# Patient Record
Sex: MALE | Race: WHITE | NOT HISPANIC OR LATINO | Employment: OTHER | URBAN - METROPOLITAN AREA
[De-identification: names, ages, dates, MRNs, and addresses within clinical notes are randomized per-mention and may not be internally consistent; named-entity substitution may affect disease eponyms.]

---

## 2017-10-05 PROBLEM — D72.829 LEUCOCYTOSIS: Status: ACTIVE | Noted: 2017-10-05

## 2017-10-19 ENCOUNTER — OFFICE VISIT (OUTPATIENT)
Dept: HEMATOLOGY/ONCOLOGY | Facility: CLINIC | Age: 62
End: 2017-10-19
Payer: COMMERCIAL

## 2017-10-19 VITALS
DIASTOLIC BLOOD PRESSURE: 102 MMHG | WEIGHT: 182.69 LBS | TEMPERATURE: 98 F | HEIGHT: 69 IN | SYSTOLIC BLOOD PRESSURE: 183 MMHG | BODY MASS INDEX: 27.06 KG/M2 | RESPIRATION RATE: 18 BRPM | HEART RATE: 76 BPM

## 2017-10-19 DIAGNOSIS — E55.9 VITAMIN D DEFICIENCY: ICD-10-CM

## 2017-10-19 DIAGNOSIS — B18.2 CHRONIC HEPATITIS C WITHOUT HEPATIC COMA: ICD-10-CM

## 2017-10-19 DIAGNOSIS — D72.828 OTHER ELEVATED WHITE BLOOD CELL (WBC) COUNT: ICD-10-CM

## 2017-10-19 DIAGNOSIS — Z12.5 SCREENING PSA (PROSTATE SPECIFIC ANTIGEN): Primary | ICD-10-CM

## 2017-10-19 PROBLEM — D72.829 LEUCOCYTOSIS: Status: ACTIVE | Noted: 2017-10-19

## 2017-10-19 PROCEDURE — 99203 OFFICE O/P NEW LOW 30 MIN: CPT | Mod: ,,, | Performed by: INTERNAL MEDICINE

## 2017-10-19 RX ORDER — ERGOCALCIFEROL 1.25 MG/1
1 CAPSULE ORAL
COMMUNITY

## 2017-10-19 NOTE — PROGRESS NOTES
"[facility] Hematolgy/Oncology  History & Physical    Subjective:      Patient ID:   NAME: Abilio Nunez : 1955     62 y.o. male    Referring Doc: Jay Ame[facility]quita ([facility])  Other Physicians: Rosa Arauz ([facility])        Chief Complaint:   leucocytosis    HPI:  62 y.o. male with diagnosis of chronic mild leucocytosis who has [facility] hematology/oncology. His wbc in 2017 was 16.9 with a mild elevation in monocytes and neutrophils. He has a history of Hep C and had an US in 2016 which showed a normal appearing liver. He is a current active smoker and has smoked a 1 ppd for the past 40+ years. His wbc was elevated at 15.1 in 2017. He is here with his son. He has been doing "good". He denies ay weight loss, night sweats or fevers. He denies any CP, SOB, HA's or N/V.               ROS:   GEN: normal without any fever, night sweats or weight loss  HEENT: normal with no HA's, sore throat, stiff neck, changes in vision  CV: normal with no CP, SOB, PND, GOMEZ or orthopnea  PULM: normal with no SOB, cough, hemoptysis, sputum or pleuritic pain  GI: normal with no abdominal pain, nausea, vomiting, constipation, diarrhea, melanotic stools, BRBPR, or hematemesis  : normal with no hematuria, dysuria  BREAST: normal with no mass, discharge, pain  SKIN: normal with no rash, erythema, bruising, or swelling       Past Medical/Surgical History:  Past Medical History:   Diagnosis Date    Chronic hepatitis C without hepatic coma 10/19/2017    Leucocytosis 10/5/2017    Leucocytosis 10/19/2017    Vitamin D deficiency 10/19/2017     No past surgical history on file.      Allergies:  Review of patient's allergies indicates:  Allergies not on file    Social/Family History:  Social History     Social History    Marital status:      Spouse name: N/A    Number of children: N/A    Years of education: N/A     Occupational History    Not on file.     Social History " "Main Topics    Smoking status: Current Every Day Smoker     Packs/day: 1.00     Types: Cigarettes    Smokeless tobacco: Current User     Types: Chew    Alcohol use 3.0 oz/week     5 Cans of beer per week    Drug use: No    Sexual activity: Not on file     Other Topics Concern    Not on file     Social History Narrative    No narrative on file     No family history on file.      Medications:    Current Outpatient Prescriptions:     ergocalciferol (ERGOCALCIFEROL) 50,000 unit Cap, Take 1 tablet by mouth., Disp: , Rfl:       Pathology:  No matching staging information was found for the patient.      Objective:   Vitals:  Blood pressure (!) 183/102, pulse 76, temperature 97.7 °F (36.5 °C), resp. rate 18, height 5' 9" (1.753 m), weight 82.9 kg (182 lb 11.2 oz).    Physical Examination:   GEN: no apparent distress, comfortable; AAOx3  HEAD: atraumatic and normocephalic  EYES: no pallor, no icterus, PERRLA  ENT: OMM, no pharyngeal erythema, external ears WNL; no nasal discharge; no thrush  NECK: no masses, thyroid normal, trachea midline, no LAD/LN's, supple  CV: RRR with no murmur; normal pulse; normal S1 and S2; no pedal edema  CHEST: Normal respiratory effort; CTAB; normal breath sounds; no wheeze or crackles  ABDOM: nontender and nondistended; soft; normal bowel sounds; no rebound/guarding  MUSC/Skeletal: ROM normal; no crepitus; joints normal; no deformities or arthropathy  EXTREM: no clubbing, cyanosis, inflammation or swelling  SKIN: no rashes, lesions, ulcers, petechiae or subcutaneous nodules  : no saunders  NEURO: grossly intact; motor/sensory WNL; AAOx3; no tremors  PSYCH: normal mood, affect and behavior  LYMPH: normal cervical, supraclavicular, axillary and groin LN's      Labs:   July 26th 2017 last available on chart    Radiology/Diagnostic Studies:    US Liver June 2016      All lab results and imaging results have been reviewed and discussed with the patient    Assessment:   (1) 62 y.o. male with " diagnosis of chronic mild leucocytosis who has [facility] hematology/oncology. His wbc in July 2017 was 16.9 with a mild elevation in monocytes and neutrophils.    - He is a current active smoker and has smoked a 1 ppd for the past 40+ years. This may be the etiology of the elevated wbc  -  His wbc was elevated in Feb 2017 and June 2016  - will order leukemia screen with flow cytometry  - rule out inflammatory process if possible - will check JANIE, RF, Sed rate and CRP      (2) Chronic Hep C with prior elevation of LFT's; he had an US in June 2016 which showed a normal appearing liver  - [facility] (Rosa Arauz NP)  - s/p Harvoni therapy x 12 weeks 9 months ago    (3) Vit D deficiency - on Vit D supplements    (4) BPH    (5) Hx/of syphilis in past        Plan:       Other elevated white blood cell (WBC) count    Vitamin D deficiency    Chronic hepatitis C without hepatic coma            PLAN:  1. Check leukemia screen with flow  2. Order JANIE, RF, sed rate, CRP  3. Encouraged tobacco cessation  4. F/u with GI and PCP  RTC in  3-4  weeks   Fax note to Davonte Tidwell        I have explained and the patient understands all of  the current recommendation(s). I have answered all of their questions to the best of my ability and to their complete satisfaction.             Thank you for allowing me to participate in this patient's care. Please call with any questions or concerns.    Electronically signed Kang Florence MD

## 2017-12-18 LAB
ALBUMIN SERPL-MCNC: 4.5 G/DL (ref 3.1–4.7)
ALP SERPL-CCNC: 75 IU/L (ref 40–104)
ALT (SGPT): 30 IU/L (ref 3–33)
AST SERPL-CCNC: 31 IU/L (ref 10–40)
BASOPHILS NFR BLD: 0.1 K/UL (ref 0–0.2)
BASOPHILS NFR BLD: 0.5 %
BILIRUB SERPL-MCNC: 0.8 MG/DL (ref 0.3–1)
BUN SERPL-MCNC: 28 MG/DL (ref 8–20)
CALCIUM SERPL-MCNC: 9.1 MG/DL (ref 7.7–10.4)
CHLORIDE: 105 MMOL/L (ref 98–110)
CO2 SERPL-SCNC: 26.4 MMOL/L (ref 22.8–31.6)
COMPLEXED PSA SERPL-MCNC: 0.3 NG/ML (ref 0–3)
CREATININE: 0.87 MG/DL (ref 0.6–1.4)
CRP SERPL-MCNC: 0.04 MG/DL (ref 0–1.4)
EOSINOPHIL NFR BLD: 0.2 K/UL (ref 0–0.7)
EOSINOPHIL NFR BLD: 2.2 %
ERYTHROCYTE [DISTWIDTH] IN BLOOD BY AUTOMATED COUNT: 11.3 % (ref 12.5–14.5)
FOLATE SERPL-MCNC: 22.5 NG/ML (ref 2.2–11.2)
GLUCOSE: 108 MG/DL (ref 70–99)
GRAN #: 5.9 K/UL (ref 1.4–6.5)
GRAN%: 62.8 %
HCT VFR BLD AUTO: 44.5 % (ref 39–55)
HGB BLD-MCNC: 16 G/DL (ref 14–16)
IMMATURE GRANS (ABS): 0 K/UL (ref 0–1)
IMMATURE GRANULOCYTES: 0.4 %
LYMPH #: 2.3 K/UL (ref 1.2–3.4)
LYMPH%: 24 %
MCH RBC QN AUTO: 34.8 PG (ref 25–35)
MCHC RBC AUTO-ENTMCNC: 36 G/DL (ref 31–36)
MCV RBC AUTO: 96.7 FL (ref 80–100)
MONO #: 1 K/UL (ref 0.1–0.6)
MONO%: 10.1 %
NUCLEATED RBCS: 0 %
NUCLEATED RED BLOOD CELLS: 0 /100 WBC
PERFORMED BY:: ABNORMAL
PLATELET # BLD AUTO: 255 K/UL (ref 140–440)
PMV BLD AUTO: 8.5 FL (ref 8.8–12.7)
POTASSIUM SERPL-SCNC: 4.2 MMOL/L (ref 3.5–5)
PROT SERPL-MCNC: 7.7 G/DL (ref 6–8.2)
RBC # BLD AUTO: 4.6 M/UL (ref 4.3–5.9)
SODIUM: 140 MMOL/L (ref 134–144)
VITAMIN B12: 790 PG/ML (ref 62–940)
WBC # BLD: 9.4 K/UL (ref 5–10)

## 2017-12-19 LAB — RHEUMATOID FACT SERPL-ACNC: 11.9 IU/ML (ref 0–13.9)

## 2018-01-09 ENCOUNTER — OFFICE VISIT (OUTPATIENT)
Dept: HEMATOLOGY/ONCOLOGY | Facility: CLINIC | Age: 63
End: 2018-01-09
Payer: COMMERCIAL

## 2018-01-09 VITALS
HEART RATE: 69 BPM | TEMPERATURE: 98 F | RESPIRATION RATE: 18 BRPM | WEIGHT: 180.69 LBS | SYSTOLIC BLOOD PRESSURE: 169 MMHG | DIASTOLIC BLOOD PRESSURE: 92 MMHG | HEIGHT: 69 IN | BODY MASS INDEX: 26.76 KG/M2

## 2018-01-09 DIAGNOSIS — N41.1 CHRONIC PROSTATITIS: ICD-10-CM

## 2018-01-09 DIAGNOSIS — D72.828 OTHER ELEVATED WHITE BLOOD CELL (WBC) COUNT: Primary | ICD-10-CM

## 2018-01-09 PROCEDURE — 99214 OFFICE O/P EST MOD 30 MIN: CPT | Mod: ,,, | Performed by: INTERNAL MEDICINE

## 2018-01-09 NOTE — PROGRESS NOTES
"   [facility] Hematology/Oncology  PROGRESS NOTE      Subjective:       Patient ID:   NAME: Abilio Nunez : 1955     62 y.o. male    Referring Doc: Jay Amezquita ([facility])  Other Physicians: Rosa Arauz ([facility])    Chief Complaint:  Leucocytosis f/u    History of Present Illness:     Patient returns today for a 2nd regularly scheduled follow-up visit.  The patient is here to go over the results of the recently ordered labs, tests and studies. He is here with his wife. He is doing ok with no new issues. He denies any CP, SOB, HA's or N/V. He has had some fatigue. Repeat CBC showed normal WBC and fairly adequate differential.             ROS:   GEN: normal without any fever, night sweats or weight loss  HEENT: normal with no HA's, sore throat, stiff neck, changes in vision  CV: normal with no CP, SOB, PND, GOMEZ or orthopnea  PULM: normal with no SOB, cough, hemoptysis, sputum or pleuritic pain  GI: normal with no abdominal pain, nausea, vomiting, constipation, diarrhea, melanotic stools, BRBPR, or hematemesis  : normal with no hematuria, dysuria  BREAST: normal with no mass, discharge, pain  SKIN: normal with no rash, erythema, bruising, or swelling    Allergies:  Review of patient's allergies indicates:  No Known Allergies    Medications:    Current Outpatient Prescriptions:     ergocalciferol (ERGOCALCIFEROL) 50,000 unit Cap, Take 1 tablet by mouth., Disp: , Rfl:     PMHx/PSHx Updates:  See patient's last visit with me on 10/19/2017.  See H&P on 10/19/2017        Pathology:  No matching staging information was found for the patient.          Objective:     Vitals:  Blood pressure (!) 169/92, pulse 69, temperature 97.6 °F (36.4 °C), resp. rate 18, height 5' 9" (1.753 m), weight 82 kg (180 lb 11.2 oz).    Physical Examination:   GEN: no apparent distress, comfortable; AAOx3  HEAD: atraumatic and normocephalic  EYES: no pallor, no icterus, PERRLA  ENT: OMM, no pharyngeal erythema, external ears WNL; no nasal " discharge; no thrush  NECK: no masses, thyroid normal, trachea midline, no LAD/LN's, supple  CV: RRR with no murmur; normal pulse; normal S1 and S2; no pedal edema  CHEST: Normal respiratory effort; CTAB; normal breath sounds; no wheeze or crackles  ABDOM: nontender and nondistended; soft; normal bowel sounds; no rebound/guarding  MUSC/Skeletal: ROM normal; no crepitus; joints normal; no deformities or arthropathy  EXTREM: no clubbing, cyanosis, inflammation or swelling  SKIN: no rashes, lesions, ulcers, petechiae or subcutaneous nodules  : no saunders  NEURO: grossly intact; motor/sensory WNL; AAOx3; no tremors  PSYCH: normal mood, affect and behavior  LYMPH: normal cervical, supraclavicular, axillary and groin LN's            Labs:     12/18/2017  Lab Results   Component Value Date    WBC 9.4 12/18/2017    HGB 16.0 12/18/2017    HCT 44.5 12/18/2017     12/18/2017     BMP  Lab Results   Component Value Date     12/18/2017    K 4.2 12/18/2017     12/18/2017    CO2 26.4 12/18/2017    BUN 28 (H) 12/18/2017    CREATININE 0.87 12/18/2017    CALCIUM 9.1 12/18/2017     Lab Results   Component Value Date    AST 31 12/18/2017    ALKPHOS 75 12/18/2017    BILITOT 0.8 12/18/2017     No results found for: [id number]  Lab Results   Component Value Date    FOLATE 22.5 (H) 12/18/2017           Radiology/Diagnostic Studies:    No results found.    I have reviewed all available lab results and radiology reports.    Assessment/Plan:   (1) 62 y.o. male with diagnosis of chronic mild leucocytosis who has been [facility] hematology/oncology. His wbc in July 2017 was 16.9 with a mild elevation in monocytes and neutrophils.    - He is a current active smoker and has smoked a 1 ppd for the past 40+ years. This may be the etiology of the elevated wbc  -  His wbc was elevated in Feb 2017 and June 2016  - repeat WBC was normal and differential was relatively adequate  - no  anemia or platelet issues  - leukemia screen with flow was ordered but was not done by the lab     (2) Chronic Hep C with prior elevation of LFT's; he had an US in June 2016 which showed a normal appearing liver  - [facility] (Rosa [facility]uz NP)  - s/p Harvoni therapy x 12 weeks 9 months ago     (3) Vit D deficiency - on Vit D supplements     (4) BPH     (5) Hx/of syphilis in past    (6) Chronic prostate issues which could be the etiologic agent of the chronic leucocytosis  - I recommend f/u with   - PSA was normal      PLAN:  1. monitor labs monthly for now  2. If any drastic changes, will then consider bone marrow biopsy  3. He should see an Urologist for the chronic prostate issues  4. Refer to  Dr Powers    RT in 4months  Fax note to Jayden    Discussion:     I have explained all of the above in detail and the patient understands all of the current recommendation(s). I have answered all of their questions to the best of my ability and to their complete satisfaction.   The patient is to continue with the current management plan.            Electronically signed by Kang Florence MD

## 2018-01-12 ENCOUNTER — TELEPHONE (OUTPATIENT)
Dept: UROLOGY | Facility: CLINIC | Age: 63
End: 2018-01-12

## 2018-01-12 NOTE — TELEPHONE ENCOUNTER
----- Message from Mik Powers MD sent at 1/9/2018  5:05 PM CST -----  Thanks for the referral    I have ccED my team to get him scheduled    Ladies - please schedule Mr Nunez for next avail NP appointment.  As Dr Florence mentioned he has had chronic prostate issues please see who has treated him in the past and obtain any relevant records, as well as any psa history not in the system.    Thanks    ----- Message -----  From: Kang Florence MD  Sent: 1/9/2018   1:21 PM  To: Mik Powers MD

## 2018-01-12 NOTE — TELEPHONE ENCOUNTER
Spoke with patient and wife, patient agreed with scheduled appt day 02/05/2018 at 1415. I did receive some information in regards to prior urologist. Patient and wife stated that he has seen Dr. Kaur in [place], previously [phone] if the phone number.     Patient and wife also stated that he was seen at [facility] which is where he had his prostate surgery approximately 8-10 yrs ago, & the urologist that performed this surgery was Dr. Allen.       I will request these records from above listed providers.

## 2018-03-31 NOTE — PROGRESS NOTES
[facility] Urology Consult:  Consult from: Dr Florence  Consult for: BPH    Abilio Nunez is a 62 y.o. male Referred by Dr Florence for evaluation of BPH and chronic prostate issues.    He was referred to Dr Florence in 10/17 for evaluation of persistent [facility]. His wbc in July 2017 was 16.9 with a mild elevation in monocytes and neutrophils. He has a history Hep C and had an US in June 2016 which showed a normal appearing liver and he completed 12 weeks Harvoni therapy. He is a current active smoker and has smoked a 1 ppd for the past 40+ years. His wbc was elevated at 15.1 in Feb 2017. Repeat WBC was normal and differential was relatively adequate. No anemia or platelet issues. On VitD supplements for deficiency.  Dr Florence is concerned his chronic prostate issues may be contributing to his leukocytosis.  12/18/17 PSA 0.3, Cr 0.87    He has previously seen Dr Kaur as well as urology at [facility] where he had prostate surgery, TURP, 8-10 years ago (Dr Allen)   [facility] and never had trouble urinating after TURP.  Had chronic prostatitis and was on alpha blocker and had many rounds of antibiotics prior to TURP  Was on CIC with Dr Kaur, then got bladder infection, all of which was before turp  No known family history of prostate cancer      AUA SS 3/0: 2 frequency; 1 nocturia  Large volume voids  Last urinated 2 hours prior, did not feel need to urinate - asked to do so and urinalysis negative, PVR  116cc  He gets up in the morning, urinates.  Has 2 cups of Coffee, then urinates again. At about 12:30 -1:00pm, he thinks he should urinate before making the trip home so he does but does not have the urge to do so.  May then urinate before bed.  Nocturia ×1.  Works as a bunch.  Denies hematuria, dysuria  Denies frequency, urgency  Does c/o decreased libido without gross ED and some fatigue  Denies perineal. Perirectal or ejaculatory  pain        Past Medical History:   Diagnosis Date    BPH (benign prostatic hyperplasia)     Chronic hepatitis C without hepatic coma 10/19/2017    Hepatitis C, chronic     Leucocytosis 10/5/2017    Leucocytosis 10/19/2017    Vitamin D deficiency 10/19/2017       History reviewed. No pertinent surgical history.    Family History   Problem Relation Age of Onset    Diabetes Mother     Lung cancer Father     Diabetes Sister        Social History     Social History    Marital status:      Spouse name: N/A    Number of children: N/A    Years of education: N/A     Occupational History    Not on file.     Social History Main Topics    Smoking status: Current Every Day Smoker     Packs/day: 1.00     Types: Cigarettes    Smokeless tobacco: Current User     Types: Chew    Alcohol use 3.0 oz/week     5 Cans of beer per week    Drug use: No    Sexual activity: Not on file     Other Topics Concern    Not on file     Social History Narrative    No narrative on file       Review of patient's allergies indicates:  No Known Allergies    Medications Reviewed: see MAR    ROS:    Constitutional: denies fevers, chills, night sweats, fatigue, malaise  Respiratory: negative for cough, shortness of breath, wheezing, dyspnea.  Cardiovascular: negative for chest pain, varicose veins, ankle swelling, palpitations, syncope.  GI: negative for abdominal pain, heartburn, indigestion, nausea, vomiting, constipation, diarrhea, blood in stool.   Urology: as noted above in HPI  Endocrinology: negative for cold intolerance, excessive thirst, not feeling tired/sluggish, no heat intolerance.   Hematology/Lymph: negative for easy bleeding, easy bruising, swollen glands.  Musculoskeletal: negative for back pain, joint pain, joint swelling, neck pain.  Allergy-Immunology: negative for seasonal allergies, negative for unusual infections.   Skin: negative for boils, breast lumps, hives, itching, rash.   Neurology: negative for,  "dizziness, headache, tingling/numbness, tremors.   Psych: satisfied with life; negative for, anxiety, depression, suicidal thoughts.     PHYSICAL EXAM:    Vitals:    04/02/18 1459   BP: (!) 159/87   Pulse: 79   Resp: 18   Temp: 98 °F (36.7 °C)     Body mass index is 26.7 kg/m². Weight: 82 kg (180 lb 12.4 oz) Height: 5' 9" (175.3 cm)       General: Alert, cooperative, no distress, appears stated age  Head: Normocephalic, without obvious abnormality, atraumatic  Neck: no masses, no thyromegaly, no lymphadenopathy  Eyes: PERRL, conjunctiva/corneas clear  Lungs: Respirations unlabored, normal effort, no accessory muscle use  CV: Warm and well perfused extremities  Abdomen: Soft, non-tender, no CVA tenderness, no hepatosplenomegaly, no hernia  Penis: phallus normal, circumcised, well cared for, no plaques or lesions.   Scrotum: no cysts, no lesions, no rash, no hydrocele.   Epididymes: normal, nontender, symmetrical, no masses or cysts.   Testes: both descended, no masses, normal, R>L.   Urethra: palpably normal with orthotopic meatus of normal size    ADAN: normal sphincter tone, no masses, no hemmorrhoids   PROSTATE: 35g, no nodules, non-tender, symmetrical.   Extremities: Extremities normal, atraumatic, no cyanosis or edema  Skin: Normal color, texture, and turgor, no rashes or lesions  Psych: Appropriate, well oriented, normal affect, normal mood  Neuro: Non-focal    Lab Results   Component Value Date    PSA 0.3 12/18/2017       LABS:    Recent Results (from the past 336 hour(s))   POCT Bladder Scan    Collection Time: 04/02/18  3:46 PM   Result Value Ref Range    POC Residual Urine Volume 116 (A) 0 - 100 mL   POCT URINE DIPSTICK WITHOUT MICROSCOPE    Collection Time: 04/02/18  3:50 PM   Result Value Ref Range    Color, UA clear     Spec Grav UA 1.010     pH, UA 8.0     WBC, UA neg     Nitrite, UA neg     Protein neg     Glucose, UA neg     Ketones, UA neg     Urobilinogen, UA neg     Bilirubin neg     Blood, UA neg  "         Assessment/Diagnosis:    1. BPH without obstruction/lower urinary tract symptoms  POCT Bladder Scan    POCT URINE DIPSTICK WITHOUT MICROSCOPE   2. Prostate cancer screening  PSA, Screening   3. Low libido  Testosterone Panel       Plans:  He has no bothersome LUTS. He may have adapted his voiding in the face of longstanding obstruction present prior to TURP but voids well without complaint, though with some decreased sensation of filling. He has mild incomplete emptying at 116cc but no bothersome LUTS nor pyuria. ADAN benign and prostate not boggy or tender concerning for prostatitis.  In the absence of these symptoms and clinical findings, would not suspect prostate issues at play with leukocytosis, which has since resolved.  At his request, for libido complaint, will check Testosterone panel and chart check results and have further discussion if indicated  Otherwise RTC annually with psa for CaP screening, or prn in interim

## 2018-04-02 ENCOUNTER — OFFICE VISIT (OUTPATIENT)
Dept: UROLOGY | Facility: CLINIC | Age: 63
End: 2018-04-02
Payer: OTHER GOVERNMENT

## 2018-04-02 VITALS
BODY MASS INDEX: 26.77 KG/M2 | WEIGHT: 180.75 LBS | HEIGHT: 69 IN | TEMPERATURE: 98 F | SYSTOLIC BLOOD PRESSURE: 159 MMHG | DIASTOLIC BLOOD PRESSURE: 87 MMHG | RESPIRATION RATE: 18 BRPM | HEART RATE: 79 BPM

## 2018-04-02 DIAGNOSIS — Z12.5 PROSTATE CANCER SCREENING: ICD-10-CM

## 2018-04-02 DIAGNOSIS — N40.0 BPH WITHOUT OBSTRUCTION/LOWER URINARY TRACT SYMPTOMS: Primary | ICD-10-CM

## 2018-04-02 DIAGNOSIS — R68.82 LOW LIBIDO: ICD-10-CM

## 2018-04-02 LAB
BILIRUB SERPL-MCNC: NORMAL MG/DL
BLOOD URINE, POC: NORMAL
COLOR, POC UA: CLEAR
GLUCOSE UR QL STRIP: NORMAL
KETONES UR QL STRIP: NORMAL
LEUKOCYTE ESTERASE URINE, POC: NORMAL
NITRITE, POC UA: NORMAL
PH, POC UA: 8
POC RESIDUAL URINE VOLUME: 116 ML (ref 0–100)
PROTEIN, POC: NORMAL
SPECIFIC GRAVITY, POC UA: 1.01
UROBILINOGEN, POC UA: NORMAL

## 2018-04-02 PROCEDURE — 51798 US URINE CAPACITY MEASURE: CPT | Mod: PBBFAC,PN | Performed by: UROLOGY

## 2018-04-02 PROCEDURE — 99213 OFFICE O/P EST LOW 20 MIN: CPT | Mod: PBBFAC,PN | Performed by: UROLOGY

## 2018-04-02 PROCEDURE — 99999 PR PBB SHADOW E&M-EST. PATIENT-LVL III: CPT | Mod: PBBFAC,,, | Performed by: UROLOGY

## 2018-04-02 PROCEDURE — 99204 OFFICE O/P NEW MOD 45 MIN: CPT | Mod: S$PBB,,, | Performed by: UROLOGY

## 2018-04-02 PROCEDURE — 81002 URINALYSIS NONAUTO W/O SCOPE: CPT | Mod: PBBFAC,PN | Performed by: UROLOGY

## 2018-04-02 NOTE — LETTER
April 9, 2018      Kang Florence MD  [address]  [address]  [place] [address]           [place] [facility]  [address] [place] [address] [address]  [place] [address]  Phone: [phone]  Fax: [phone]          Patient: Abilio Nunez   MR Number: [id number]   YOB: 1955   Date of Visit: 4/2/2018       Dear Dr. Kang Florence:    Thank you for referring Abilio Nunez to me for evaluation. Attached you will find relevant portions of my assessment and plan of care.    If you have questions, please do not hesitate to call me. I look forward to following Abilio Nunez along with you.    Sincerely,    Mik Powers MD    Enclosure  CC:  No Recipients    If you would like to receive this communication electronically, please contact [email or web address] or [phone] to request more information on [employer] Link access.    For providers and/or their staff who would like to refer a patient to [facility], please contact us through our one-stop-shop provider referral line, [facility], at [phone].    If you feel you have received this communication in error or would no longer like to receive these types of communications, please e-mail [email or web address]

## 2018-04-17 ENCOUNTER — LAB VISIT (OUTPATIENT)
Dept: LAB | Facility: HOSPITAL | Age: 63
End: 2018-04-17
Attending: UROLOGY
Payer: OTHER GOVERNMENT

## 2018-04-17 DIAGNOSIS — R68.82 LOW LIBIDO: ICD-10-CM

## 2018-04-17 PROCEDURE — 36415 COLL VENOUS BLD VENIPUNCTURE: CPT

## 2018-04-17 PROCEDURE — 82040 ASSAY OF SERUM ALBUMIN: CPT

## 2018-04-20 ENCOUNTER — TELEPHONE (OUTPATIENT)
Dept: UROLOGY | Facility: CLINIC | Age: 63
End: 2018-04-20

## 2018-04-20 LAB
ALBUMIN SERPL-MCNC: 4.4 G/DL (ref 3.6–5.1)
SHBG SERPL-SCNC: 76 NMOL/L (ref 22–77)
TESTOST FREE SERPL-MCNC: 31.8 PG/ML (ref 46–224)
TESTOST SERPL-MCNC: 500 NG/DL (ref 250–1100)
TESTOSTERONE.FREE+WB SERPL-MCNC: 64.1 NG/DL (ref 110–575)

## 2018-04-20 NOTE — TELEPHONE ENCOUNTER
Pt's wife called to see if results were back on Pt's Testosterone lab test. Wife was notified the lab was still processing. Pt would get a call with results after review by Dr Powers. Wife verbalized understanding.

## 2018-05-07 ENCOUNTER — HISTORICAL (OUTPATIENT)
Dept: ADMINISTRATIVE | Facility: HOSPITAL | Age: 63
End: 2018-05-07

## 2018-05-07 ENCOUNTER — OFFICE VISIT (OUTPATIENT)
Dept: HEMATOLOGY/ONCOLOGY | Facility: CLINIC | Age: 63
End: 2018-05-07
Payer: OTHER GOVERNMENT

## 2018-05-07 VITALS
TEMPERATURE: 98 F | WEIGHT: 173.19 LBS | SYSTOLIC BLOOD PRESSURE: 188 MMHG | HEIGHT: 69 IN | BODY MASS INDEX: 25.65 KG/M2 | DIASTOLIC BLOOD PRESSURE: 92 MMHG | HEART RATE: 73 BPM

## 2018-05-07 DIAGNOSIS — D72.828 OTHER ELEVATED WHITE BLOOD CELL (WBC) COUNT: Primary | ICD-10-CM

## 2018-05-07 LAB
ALBUMIN SERPL-MCNC: 4.8 G/DL (ref 3.1–4.7)
ALP SERPL-CCNC: 83 IU/L (ref 40–104)
ALT (SGPT): 29 IU/L (ref 3–33)
AST SERPL-CCNC: 32 IU/L (ref 10–40)
BASOPHILS NFR BLD: 0 K/UL (ref 0–0.2)
BASOPHILS NFR BLD: 0.3 %
BILIRUB SERPL-MCNC: 0.7 MG/DL (ref 0.3–1)
BUN SERPL-MCNC: 19 MG/DL (ref 8–20)
CALCIUM SERPL-MCNC: 9.3 MG/DL (ref 7.7–10.4)
CHLORIDE: 105 MMOL/L (ref 98–110)
CO2 SERPL-SCNC: 26.1 MMOL/L (ref 22.8–31.6)
CREATININE: 0.84 MG/DL (ref 0.6–1.4)
EOSINOPHIL NFR BLD: 0.2 K/UL (ref 0–0.7)
EOSINOPHIL NFR BLD: 1.9 %
ERYTHROCYTE [DISTWIDTH] IN BLOOD BY AUTOMATED COUNT: 11.1 % (ref 12.5–14.5)
GLUCOSE: 104 MG/DL (ref 70–99)
GRAN #: 7.5 K/UL (ref 1.4–6.5)
GRAN%: 60.7 %
HCT VFR BLD AUTO: 47.8 % (ref 39–55)
HGB BLD-MCNC: 16.9 G/DL (ref 14–16)
IMMATURE GRANS (ABS): 0.1 K/UL (ref 0–1)
IMMATURE GRANULOCYTES: 0.4 %
LYMPH #: 3.2 K/UL (ref 1.2–3.4)
LYMPH%: 25.9 %
MCH RBC QN AUTO: 34.8 PG (ref 25–35)
MCHC RBC AUTO-ENTMCNC: 35.4 G/DL (ref 31–36)
MCV RBC AUTO: 98.4 FL (ref 80–100)
MONO #: 1.3 K/UL (ref 0.1–0.6)
MONO%: 10.8 %
NUCLEATED RBCS: 0 %
NUCLEATED RED BLOOD CELLS: 0 /100 WBC
PERFORMED BY:: ABNORMAL
PLATELET # BLD AUTO: 265 K/UL (ref 140–440)
PMV BLD AUTO: 8.2 FL (ref 8.8–12.7)
POTASSIUM SERPL-SCNC: 4.2 MMOL/L (ref 3.5–5)
PROT SERPL-MCNC: 8.2 G/DL (ref 6–8.2)
RBC # BLD AUTO: 4.86 M/UL (ref 4.3–5.9)
SODIUM: 140 MMOL/L (ref 134–144)
WBC # BLD: 12.4 K/UL (ref 5–10)

## 2018-05-07 PROCEDURE — 99213 OFFICE O/P EST LOW 20 MIN: CPT | Mod: ,,, | Performed by: INTERNAL MEDICINE

## 2018-05-07 NOTE — PROGRESS NOTES
"   [facility] Hematology/Oncology  PROGRESS NOTE      Subjective:       Patient ID:   NAME: Abilio Nunez : 1955     62 y.o. male    Referring Doc: [facility] ([facility])  Other Physicians: Rosa Arauz ([facility])    Chief Complaint:  Leucocytosis f/u    History of Present Illness:     Patient returns today for a 3rd regularly scheduled follow-up visit.  The patient is here to go over the results of the recently ordered labs, tests and studies. He is here with his son. He is doing ok with no new issues. He denies any CP, SOB, HA's or N/V. His last labs he had done were from Dec 2017. He is here with his son. He was supposed to be getting monthly labs but has not.           ROS:   GEN: normal without any fever, night sweats or weight loss  HEENT: normal with no HA's, sore throat, stiff neck, changes in vision  CV: normal with no CP, SOB, PND, GOMEZ or orthopnea  PULM: normal with no SOB, cough, hemoptysis, sputum or pleuritic pain  GI: normal with no abdominal pain, nausea, vomiting, constipation, diarrhea, melanotic stools, BRBPR, or hematemesis  : normal with no hematuria, dysuria  BREAST: normal with no mass, discharge, pain  SKIN: normal with no rash, erythema, bruising, or swelling    Allergies:  Review of patient's allergies indicates:  No Known Allergies    Medications:    Current Outpatient Prescriptions:     ergocalciferol (ERGOCALCIFEROL) 50,000 unit Cap, Take 1 tablet by mouth., Disp: , Rfl:     PMHx/PSHx Updates:  See patient's last visit with me on 2018.  See H&P on 10/19/2017        Pathology:  none          Objective:     Vitals:  Blood pressure (!) 188/92, pulse 73, temperature 98.1 °F (36.7 °C), height 5' 9" (1.753 m), weight 78.6 kg (173 lb 3.2 oz).    Physical Examination:   GEN: no apparent distress, comfortable; AAOx3  HEAD: atraumatic and normocephalic  EYES: no pallor, no icterus, PERRLA  ENT: OMM, no pharyngeal erythema, external ears WNL; no nasal discharge; no thrush  NECK: no masses, " thyroid normal, trachea midline, no LAD/LN's, supple  CV: RRR with no murmur; normal pulse; normal S1 and S2; no pedal edema  CHEST: Normal respiratory effort; CTAB; normal breath sounds; no wheeze or crackles  ABDOM: nontender and nondistended; soft; normal bowel sounds; no rebound/guarding  MUSC/Skeletal: ROM normal; no crepitus; joints normal; no deformities or arthropathy  EXTREM: no clubbing, cyanosis, inflammation or swelling  SKIN: no rashes, lesions, ulcers, petechiae or subcutaneous nodules  : no saunders  NEURO: grossly intact; motor/sensory WNL; AAOx3; no tremors  PSYCH: normal mood, affect and behavior  LYMPH: normal cervical, supraclavicular, axillary and groin LN's            Labs:     12/18/2017  Lab Results   Component Value Date    WBC 9.4 12/18/2017    HGB 16.0 12/18/2017    HCT 44.5 12/18/2017     12/18/2017     BMP  Lab Results   Component Value Date     12/18/2017    K 4.2 12/18/2017     12/18/2017    CO2 26.4 12/18/2017    BUN 28 (H) 12/18/2017    CREATININE 0.87 12/18/2017    CALCIUM 9.1 12/18/2017     Lab Results   Component Value Date    AST 31 12/18/2017    ALKPHOS 75 12/18/2017    BILITOT 0.8 12/18/2017     No results found for: ZLEUYXFC70  Lab Results   Component Value Date    FOLATE 22.5 (H) 12/18/2017           Radiology/Diagnostic Studies:    No results found.    I have reviewed all available lab results and radiology reports.    Assessment/Plan:   (1) 62 y.o. male with diagnosis of chronic mild leucocytosis who [facility]oncology. His wbc in July 2017 was 16.9 with a mild elevation in monocytes and neutrophils.    - He is a current active smoker and has smoked a 1 ppd for the past 40+ years. This may be the etiology of the elevated wbc  -  His wbc was elevated in Feb 2017 and June 2016  - repeat WBC was normal and differential was relatively adequate  - no anemia or platelet issues  - leukemia  screen with flow was ordered but was not done by the lab     (2) Chronic Hep C with prior elevation of LFT's; he had an US in June 2016 which showed a normal appearing liver  - [facility]  - s/p Harvoni therapy x 12 weeks 9 months ago     (3) Vit D deficiency - on Vit D supplements     (4) BPH     (5) Hx/of syphilis in past    (6) Chronic prostate issues which could be the etiologic agent of the chronic leucocytosis  - I recommend f/u with   - PSA was normal  - he saw Dr Powers with     (7) Noncompliance with recommendations and labs    PLAN:  1. Check up to date labs and encouraged him to have them done labs monthly   2. If any drastic changes, will then consider bone marrow biopsy  3. F/u with PCP, , etc      RTC in 4months  Fax note to Priya Amezquita and [place]    Discussion:     I have explained all of the above in detail and the patient understands all of the current recommendation(s). I have answered all of their questions to the best of my ability and to their complete satisfaction.   The patient is to continue with the current management plan.            Electronically signed by Kang Florence MD

## 2018-05-07 NOTE — LETTER
May 7, 2018      Jay Amezquita MD  [address] [place] [address]           [facility] Hematology Oncology  [address]  [address]  [facility] [address]  Phone: [phone]  Fax: [phone]          Patient: Abilio Nunez   MR Number: [id number]   YOB: 1955   Date of Visit: 5/7/2018       Dear Dr. Jay Amezquita:    Thank you for referring Abilio Nunez to me for evaluation. Attached you will find relevant portions of my assessment and plan of care.    If you have questions, please do not hesitate to call me. I look forward to following Abilio Nunez along with you.    Sincerely,    Kang Florence MD    Enclosure  CC:  No Recipients    If you would like to receive this communication electronically, please contact [email or web address] or [phone] to request more information on [employer] access.    For providers and/or their staff who would like to refer a patient to [facility], please contact us through our one-stop-shop provider referral line, [facility] , at [phone].    If you feel you have received this communication in error or would no longer like to receive these types of communications, please e-mail [email or web address]

## 2018-05-09 NOTE — TELEPHONE ENCOUNTER
Spoke with patient wife in regards to the results. Patient wife verbalized understanding with no further questions at this time.

## 2018-08-30 ENCOUNTER — TELEPHONE (OUTPATIENT)
Dept: HEMATOLOGY/ONCOLOGY | Facility: CLINIC | Age: 63
End: 2018-08-30

## 2018-08-30 NOTE — TELEPHONE ENCOUNTER
Called the pt to remind him to do the bloodwork prior too his f/u visit on Tuesday.    Called the home # no answer, mobile # LM

## 2019-02-27 ENCOUNTER — TELEPHONE (OUTPATIENT)
Dept: HEMATOLOGY/ONCOLOGY | Facility: CLINIC | Age: 64
End: 2019-02-27

## 2019-02-27 DIAGNOSIS — D72.829 LEUKOCYTOSIS, UNSPECIFIED TYPE: Primary | ICD-10-CM

## 2019-02-27 NOTE — TELEPHONE ENCOUNTER
Called patient wife went over labs told her to bring him over to get flow cytometry done     ----- Message from Saadia Figueredo sent at 2/25/2019  3:51 PM CST -----  Contact: Pts spouse  Pts spouse called.  [facility]  Now has elevated WBC.  Will get [facility] to [facility] copy of the [facility].  Doesn't know what to do.    # [phone]

## 2019-05-30 ENCOUNTER — TELEPHONE (OUTPATIENT)
Dept: HEMATOLOGY/ONCOLOGY | Facility: CLINIC | Age: 64
End: 2019-05-30

## 2019-05-30 DIAGNOSIS — D72.829 LEUKOCYTOSIS, UNSPECIFIED TYPE: Primary | ICD-10-CM

## 2019-05-30 DIAGNOSIS — B18.2 CHRONIC HEPATITIS C WITHOUT HEPATIC COMA: ICD-10-CM

## 2019-05-31 ENCOUNTER — TELEPHONE (OUTPATIENT)
Dept: HEMATOLOGY/ONCOLOGY | Facility: CLINIC | Age: 64
End: 2019-05-31

## 2019-05-31 NOTE — TELEPHONE ENCOUNTER
Left voicemail asking patient to call the office back to schedule a follow up, he has not been seen since 09/2018. AE

## 2019-10-30 NOTE — PROGRESS NOTES
[facility] Hematology/Oncology  PROGRESS NOTE      Subjective:       Patient ID:   NAME: Abilio Nunez : 1955     64 y.o. male    Referring Doc: [facility] ([facility]  Other Physicians: Rosa Arauz ([facility])    Chief Complaint:  Leucocytosis f/u    History of Present Illness:     Patient returns today for a regularly scheduled follow-up visit.  The patient is here to go over the results of the recently ordered labs, tests and studies. He is here by himself. He is doing ok with no new issues. He denies any CP, SOB, HA's or N/V.  He last showed for an appointment in MAy 2018 and has been noncompliant with recommended follow-up. He has not done any labs since May 2018.           ROS:   GEN: normal without any fever, night sweats or weight loss  HEENT: normal with no HA's, sore throat, stiff neck, changes in vision  CV: normal with no CP, SOB, PND, GOMEZ or orthopnea  PULM: normal with no SOB, cough, hemoptysis, sputum or pleuritic pain  GI: normal with no abdominal pain, nausea, vomiting, constipation, diarrhea, melanotic stools, BRBPR, or hematemesis  : normal with no hematuria, dysuria  BREAST: normal with no mass, discharge, pain  SKIN: normal with no rash, erythema, bruising, or swelling; crusted lesion on left neck    Allergies:  Review of patient's allergies indicates:  No Known Allergies    Medications:    Current Outpatient Medications:     ergocalciferol (ERGOCALCIFEROL) 50,000 unit Cap, Take 1 tablet by mouth., Disp: , Rfl:     PMHx/PSHx Updates:  See patient's last visit with me on 2018.  See H&P on 10/19/2017        Pathology:  none          Objective:     Vitals:  Blood pressure (!) 155/79, pulse 65, temperature 98 °F (36.7 °C), temperature source Oral, resp. rate 20, weight 78.9 kg (173 lb 14.4 oz).    Physical Examination:   GEN: no apparent distress, comfortable; AAOx3  HEAD: atraumatic and normocephalic  EYES: no pallor, no icterus, PERRLA  ENT: OMM, no pharyngeal erythema, external ears  WNL; no nasal discharge; no thrush  NECK: no masses, thyroid normal, trachea midline, no LAD/LN's, supple  CV: RRR with no murmur; normal pulse; normal S1 and S2; no pedal edema  CHEST: Normal respiratory effort; CTAB; normal breath sounds; no wheeze or crackles  ABDOM: nontender and nondistended; soft; normal bowel sounds; no rebound/guarding  MUSC/Skeletal: ROM normal; no crepitus; joints normal; no deformities or arthropathy  EXTREM: no clubbing, cyanosis, inflammation or swelling  SKIN: no rashes, lesions, ulcers, petechiae or subcutaneous nodules; crusted lesion on left neck  : no saunders  NEURO: grossly intact; motor/sensory WNL; AAOx3; no tremors  PSYCH: normal mood, affect and behavior  LYMPH: normal cervical, supraclavicular, axillary and groin LN's            Labs:   No new labs        Radiology/Diagnostic Studies:    No results found.    I have reviewed all available lab results and radiology reports.    Assessment/Plan:   (1) 64 y.o. male with diagnosis of chronic mild leucocytosis who has [facility] hematology/oncology. His wbc in July 2017 was 16.9 with a mild elevation in monocytes and neutrophils.    - He is a current active smoker and has smoked a 1 ppd for the past 40+ years. This may be the etiology of the elevated wbc  -  he has not had any labs done since MAy 2018  - no anemia or platelet issues  - leukemia screen with flow was ordered but was not done by the lab     (2) Chronic Hep C with prior elevation of LFT's; he had an US in June 2016 which showed a normal appearing liver  - [facility])  - s/p Harvoni therapy x 12 weeks 9 months ago     (3) Vit D deficiency - on Vit D supplements     (4) BPH     (5) Hx/of syphilis in past    (6) Chronic prostate issues which could be the etiologic agent of the chronic leucocytosis  - I recommend f/u with   - PSA was normal  - he saw Dr Powers with     (7)  Noncompliance with recommendations and labs    PLAN:  1. Check up to date labs   2. If any drastic changes, will then consider bone marrow biopsy  3. F/u with PCP, , etc  4. RTC 6 months  5. Refer to dermatology  Fax note to Vitaly Amezquita    Discussion:     I have explained all of the above in detail and the patient understands all of the current recommendation(s). I have answered all of their questions to the best of my ability and to their complete satisfaction.   The patient is to continue with the current management plan.            Electronically signed by Kang Florence MD

## 2019-10-31 ENCOUNTER — LAB VISIT (OUTPATIENT)
Dept: LAB | Facility: HOSPITAL | Age: 64
End: 2019-10-31
Attending: INTERNAL MEDICINE
Payer: COMMERCIAL

## 2019-10-31 ENCOUNTER — OFFICE VISIT (OUTPATIENT)
Dept: HEMATOLOGY/ONCOLOGY | Facility: CLINIC | Age: 64
End: 2019-10-31
Payer: COMMERCIAL

## 2019-10-31 VITALS
BODY MASS INDEX: 25.68 KG/M2 | HEART RATE: 65 BPM | WEIGHT: 173.88 LBS | TEMPERATURE: 98 F | RESPIRATION RATE: 20 BRPM | SYSTOLIC BLOOD PRESSURE: 155 MMHG | DIASTOLIC BLOOD PRESSURE: 79 MMHG

## 2019-10-31 DIAGNOSIS — D72.829 LEUKOCYTOSIS, UNSPECIFIED TYPE: ICD-10-CM

## 2019-10-31 DIAGNOSIS — L98.9 SKIN LESION OF NECK: ICD-10-CM

## 2019-10-31 DIAGNOSIS — B18.2 CHRONIC HEPATITIS C WITHOUT HEPATIC COMA: ICD-10-CM

## 2019-10-31 DIAGNOSIS — D72.829 LEUKOCYTOSIS, UNSPECIFIED TYPE: Primary | ICD-10-CM

## 2019-10-31 LAB
ALBUMIN SERPL BCP-MCNC: 4.5 G/DL (ref 3.5–5.2)
ALP SERPL-CCNC: 69 U/L (ref 55–135)
ALT SERPL W/O P-5'-P-CCNC: 25 U/L (ref 10–44)
ANION GAP SERPL CALC-SCNC: 9 MMOL/L (ref 8–16)
AST SERPL-CCNC: 29 U/L (ref 10–40)
BASOPHILS # BLD AUTO: 0.06 K/UL (ref 0–0.2)
BASOPHILS NFR BLD: 0.4 % (ref 0–1.9)
BILIRUB SERPL-MCNC: 0.7 MG/DL (ref 0.1–1)
BUN SERPL-MCNC: 19 MG/DL (ref 8–23)
CALCIUM SERPL-MCNC: 9.3 MG/DL (ref 8.7–10.5)
CHLORIDE SERPL-SCNC: 102 MMOL/L (ref 95–110)
CO2 SERPL-SCNC: 31 MMOL/L (ref 23–29)
CREAT SERPL-MCNC: 1.1 MG/DL (ref 0.5–1.4)
DIFFERENTIAL METHOD: ABNORMAL
EOSINOPHIL # BLD AUTO: 0.3 K/UL (ref 0–0.5)
EOSINOPHIL NFR BLD: 2.2 % (ref 0–8)
ERYTHROCYTE [DISTWIDTH] IN BLOOD BY AUTOMATED COUNT: 11.5 % (ref 11.5–14.5)
EST. GFR  (AFRICAN AMERICAN): >60 ML/MIN/1.73 M^2
EST. GFR  (NON AFRICAN AMERICAN): >60 ML/MIN/1.73 M^2
GLUCOSE SERPL-MCNC: 107 MG/DL (ref 70–110)
HCT VFR BLD AUTO: 42.5 % (ref 40–54)
HGB BLD-MCNC: 14.9 G/DL (ref 14–18)
IMM GRANULOCYTES # BLD AUTO: 0.09 K/UL (ref 0–0.04)
IMM GRANULOCYTES NFR BLD AUTO: 0.7 % (ref 0–0.5)
LYMPHOCYTES # BLD AUTO: 2.6 K/UL (ref 1–4.8)
LYMPHOCYTES NFR BLD: 19.3 % (ref 18–48)
MCH RBC QN AUTO: 34.8 PG (ref 27–31)
MCHC RBC AUTO-ENTMCNC: 35.1 G/DL (ref 32–36)
MCV RBC AUTO: 99 FL (ref 82–98)
MONOCYTES # BLD AUTO: 1.2 K/UL (ref 0.3–1)
MONOCYTES NFR BLD: 9.1 % (ref 4–15)
NEUTROPHILS # BLD AUTO: 9.1 K/UL (ref 1.8–7.7)
NEUTROPHILS NFR BLD: 68.3 % (ref 38–73)
NRBC BLD-RTO: 0 /100 WBC
PLATELET # BLD AUTO: 278 K/UL (ref 150–350)
PMV BLD AUTO: 8.4 FL (ref 9.2–12.9)
POTASSIUM SERPL-SCNC: 4.1 MMOL/L (ref 3.5–5.1)
PROT SERPL-MCNC: 7.4 G/DL (ref 6–8.4)
RBC # BLD AUTO: 4.28 M/UL (ref 4.6–6.2)
SODIUM SERPL-SCNC: 142 MMOL/L (ref 136–145)
WBC # BLD AUTO: 13.37 K/UL (ref 3.9–12.7)

## 2019-10-31 PROCEDURE — 99213 OFFICE O/P EST LOW 20 MIN: CPT | Mod: S$GLB,,, | Performed by: INTERNAL MEDICINE

## 2019-10-31 PROCEDURE — 85025 COMPLETE CBC W/AUTO DIFF WBC: CPT

## 2019-10-31 PROCEDURE — 99213 PR OFFICE/OUTPT VISIT, EST, LEVL III, 20-29 MIN: ICD-10-PCS | Mod: S$GLB,,, | Performed by: INTERNAL MEDICINE

## 2019-10-31 PROCEDURE — 80053 COMPREHEN METABOLIC PANEL: CPT

## 2019-11-07 ENCOUNTER — TELEPHONE (OUTPATIENT)
Dept: HEMATOLOGY/ONCOLOGY | Facility: CLINIC | Age: 64
End: 2019-11-07

## 2019-11-07 NOTE — TELEPHONE ENCOUNTER
----- Message from Jennifer Dowling sent at 11/6/2019  4:15 PM CST -----  The patient's wife called to get the patient's lab results. Please call them back at [phone].

## 2024-03-03 ENCOUNTER — HOSPITAL ENCOUNTER (EMERGENCY)
Facility: HOSPITAL | Age: 69
Discharge: HOME | End: 2024-03-03
Attending: EMERGENCY MEDICINE
Payer: OTHER GOVERNMENT

## 2024-03-03 VITALS
HEIGHT: 69 IN | WEIGHT: 173 LBS | BODY MASS INDEX: 25.62 KG/M2 | OXYGEN SATURATION: 95 % | SYSTOLIC BLOOD PRESSURE: 162 MMHG | RESPIRATION RATE: 18 BRPM | TEMPERATURE: 98 F | DIASTOLIC BLOOD PRESSURE: 72 MMHG | HEART RATE: 69 BPM

## 2024-03-03 DIAGNOSIS — K40.20 BILATERAL INGUINAL HERNIA WITHOUT OBSTRUCTION OR GANGRENE, RECURRENCE NOT SPECIFIED: Primary | ICD-10-CM

## 2024-03-03 LAB
ALBUMIN SERPL BCP-MCNC: 3.7 G/DL (ref 3.5–5.2)
ALP SERPL-CCNC: 89 U/L (ref 55–135)
ALT SERPL W/O P-5'-P-CCNC: 22 U/L (ref 10–44)
ANION GAP SERPL CALC-SCNC: 10 MMOL/L (ref 8–16)
AST SERPL-CCNC: 24 U/L (ref 10–40)
BASOPHILS # BLD AUTO: 0.06 K/UL (ref 0–0.2)
BASOPHILS NFR BLD: 0.4 % (ref 0–1.9)
BILIRUB SERPL-MCNC: 0.5 MG/DL (ref 0.1–1)
BILIRUB UR QL STRIP: NEGATIVE
BUN SERPL-MCNC: 11 MG/DL (ref 8–23)
CALCIUM SERPL-MCNC: 9 MG/DL (ref 8.7–10.5)
CHLORIDE SERPL-SCNC: 103 MMOL/L (ref 95–110)
CLARITY UR: CLEAR
CO2 SERPL-SCNC: 25 MMOL/L (ref 23–29)
COLOR UR: COLORLESS
CREAT SERPL-MCNC: 0.8 MG/DL (ref 0.5–1.4)
DIFFERENTIAL METHOD BLD: ABNORMAL
EOSINOPHIL # BLD AUTO: 0.1 K/UL (ref 0–0.5)
EOSINOPHIL NFR BLD: 0.5 % (ref 0–8)
ERYTHROCYTE [DISTWIDTH] IN BLOOD BY AUTOMATED COUNT: 11.4 % (ref 11.5–14.5)
EST. GFR  (NO RACE VARIABLE): >60 ML/MIN/1.73 M^2
GLUCOSE SERPL-MCNC: 108 MG/DL (ref 70–110)
GLUCOSE UR QL STRIP: NEGATIVE
HCT VFR BLD AUTO: 41.6 % (ref 40–54)
HGB BLD-MCNC: 14.9 G/DL (ref 14–18)
HGB UR QL STRIP: NEGATIVE
IMM GRANULOCYTES # BLD AUTO: 0.1 K/UL (ref 0–0.04)
IMM GRANULOCYTES NFR BLD AUTO: 0.6 % (ref 0–0.5)
KETONES UR QL STRIP: NEGATIVE
LEUKOCYTE ESTERASE UR QL STRIP: NEGATIVE
LIPASE SERPL-CCNC: 44 U/L (ref 4–60)
LYMPHOCYTES # BLD AUTO: 1.2 K/UL (ref 1–4.8)
LYMPHOCYTES NFR BLD: 7.2 % (ref 18–48)
MCH RBC QN AUTO: 35.1 PG (ref 27–31)
MCHC RBC AUTO-ENTMCNC: 35.8 G/DL (ref 32–36)
MCV RBC AUTO: 98 FL (ref 82–98)
MONOCYTES # BLD AUTO: 1.2 K/UL (ref 0.3–1)
MONOCYTES NFR BLD: 7 % (ref 4–15)
NEUTROPHILS # BLD AUTO: 14.4 K/UL (ref 1.8–7.7)
NEUTROPHILS NFR BLD: 84.3 % (ref 38–73)
NITRITE UR QL STRIP: NEGATIVE
NRBC BLD-RTO: 0 /100 WBC
PH UR STRIP: 8 [PH] (ref 5–8)
PLATELET # BLD AUTO: 274 K/UL (ref 150–450)
PMV BLD AUTO: 8.4 FL (ref 9.2–12.9)
POTASSIUM SERPL-SCNC: 3.9 MMOL/L (ref 3.5–5.1)
PROT SERPL-MCNC: 6.6 G/DL (ref 6–8.4)
PROT UR QL STRIP: NEGATIVE
RBC # BLD AUTO: 4.24 M/UL (ref 4.6–6.2)
SODIUM SERPL-SCNC: 138 MMOL/L (ref 136–145)
SP GR UR STRIP: 1.01 (ref 1–1.03)
URN SPEC COLLECT METH UR: ABNORMAL
UROBILINOGEN UR STRIP-ACNC: NEGATIVE EU/DL
WBC # BLD AUTO: 17.03 K/UL (ref 3.9–12.7)

## 2024-03-03 PROCEDURE — 99285 EMERGENCY DEPT VISIT HI MDM: CPT | Mod: 25

## 2024-03-03 PROCEDURE — 81003 URINALYSIS AUTO W/O SCOPE: CPT | Performed by: EMERGENCY MEDICINE

## 2024-03-03 PROCEDURE — 85025 COMPLETE CBC W/AUTO DIFF WBC: CPT | Performed by: EMERGENCY MEDICINE

## 2024-03-03 PROCEDURE — 83690 ASSAY OF LIPASE: CPT | Performed by: EMERGENCY MEDICINE

## 2024-03-03 PROCEDURE — 80053 COMPREHEN METABOLIC PANEL: CPT | Performed by: EMERGENCY MEDICINE

## 2024-03-03 PROCEDURE — 36415 COLL VENOUS BLD VENIPUNCTURE: CPT | Performed by: EMERGENCY MEDICINE

## 2024-03-03 PROCEDURE — 25500020 PHARM REV CODE 255

## 2024-03-03 RX ADMIN — IOHEXOL 75 ML: 350 INJECTION, SOLUTION INTRAVENOUS at 12:03
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
## 2024-03-06 ENCOUNTER — TELEPHONE (OUTPATIENT)
Dept: SURGERY | Facility: CLINIC | Age: 69
End: 2024-03-06
Payer: OTHER GOVERNMENT

## 2024-03-06 NOTE — TELEPHONE ENCOUNTER
Spoke with wife, advised we have the referral but is still pending.  [facility] and we will see if approved by Monday.  If not we will cancel.

## 2024-03-06 NOTE — TELEPHONE ENCOUNTER
----- Message from Brain Sorensen sent at 3/6/2024  3:04 PM CST -----  Type:  Patient Returning Call    Who Called:PT  Who Left Message for Patient:  Does the patient know what this is regarding?:APPT- HAS REFERRAL  Would the patient rather a call back or a response via [employer]? Call  Best Call Back Number: [phone]  Additional Information: pt states they would like  a call from dept to se if pt should be seen before 8/2024. If not pt states he would like to schedule asap. Thank you

## 2024-10-24 ENCOUNTER — PATIENT MESSAGE (OUTPATIENT)
Dept: RESEARCH | Facility: HOSPITAL | Age: 69
End: 2024-10-24
Payer: OTHER GOVERNMENT